# Patient Record
Sex: MALE | Race: WHITE | NOT HISPANIC OR LATINO | Employment: OTHER | URBAN - METROPOLITAN AREA
[De-identification: names, ages, dates, MRNs, and addresses within clinical notes are randomized per-mention and may not be internally consistent; named-entity substitution may affect disease eponyms.]

---

## 2017-02-28 PROBLEM — E78.5 DYSLIPIDEMIA: Status: ACTIVE | Noted: 2017-02-28

## 2017-02-28 PROBLEM — I25.9 IHD (ISCHEMIC HEART DISEASE): Status: ACTIVE | Noted: 2017-02-28

## 2017-07-18 ENCOUNTER — OFFICE VISIT (OUTPATIENT)
Dept: CARDIOLOGY | Facility: CLINIC | Age: 77
End: 2017-07-18

## 2017-07-18 VITALS
BODY MASS INDEX: 28.71 KG/M2 | SYSTOLIC BLOOD PRESSURE: 124 MMHG | WEIGHT: 216.6 LBS | HEART RATE: 72 BPM | DIASTOLIC BLOOD PRESSURE: 72 MMHG | HEIGHT: 73 IN

## 2017-07-18 DIAGNOSIS — E78.5 HYPERLIPIDEMIA LDL GOAL <70: ICD-10-CM

## 2017-07-18 DIAGNOSIS — I25.10 CORONARY ARTERY DISEASE INVOLVING NATIVE CORONARY ARTERY OF NATIVE HEART WITHOUT ANGINA PECTORIS: Primary | ICD-10-CM

## 2017-07-18 PROCEDURE — 99213 OFFICE O/P EST LOW 20 MIN: CPT | Performed by: NURSE PRACTITIONER

## 2017-07-18 RX ORDER — FINASTERIDE 5 MG/1
5 TABLET, FILM COATED ORAL DAILY
COMMUNITY

## 2017-07-18 RX ORDER — DIMENHYDRINATE 50 MG
TABLET ORAL
COMMUNITY

## 2017-07-18 RX ORDER — SIMVASTATIN 10 MG
10 TABLET ORAL NIGHTLY
COMMUNITY

## 2017-07-18 RX ORDER — ASPIRIN 81 MG/1
81 TABLET ORAL DAILY
Start: 2017-07-18

## 2017-07-18 RX ORDER — MELATONIN
1000 DAILY
COMMUNITY

## 2017-07-18 RX ORDER — UBIDECARENONE 100 MG
100 CAPSULE ORAL DAILY
COMMUNITY

## 2017-07-18 RX ORDER — SODIUM PHOSPHATE,MONO-DIBASIC 19G-7G/118
1 ENEMA (ML) RECTAL
COMMUNITY

## 2017-07-18 RX ORDER — DULOXETIN HYDROCHLORIDE 60 MG/1
60 CAPSULE, DELAYED RELEASE ORAL DAILY
COMMUNITY

## 2017-07-18 NOTE — PROGRESS NOTES
Encounter Date:07/18/2017    Patient ID: Regino Wang is a 77 y.o. 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    CC/Reason for visit:  Ischemic heart disease          Regino Wang returns today for 12 month follow-up for his coronary artery disease and cardiac risk factors.  He was formerly a patient of Dr. Uriah Klein.  Since his last visit he has been doing fairly well.  He has been trying to stay active and is currently doing water aerobics several times per week.  He he did have one episode of chest discomfort several months ago and he applied his electronic pathology her to the area and it resolved his symptoms.  He denies any undue dyspnea on exertion.  Overall he feels he is doing well.  He recently had blood work with his primary care provider and his LDL is at goal with a result of 64.  His triglycerides were slightly elevated at 161.    Review of Systems   Constitution: Positive for weakness.   Musculoskeletal: Positive for arthritis and muscle weakness.   Genitourinary: Positive for frequency.   Psychiatric/Behavioral: The patient is nervous/anxious.        The patient's past medical, social, and family history reviewed in the patient's electronic medical record.    Allergies  Lipitor [atorvastatin] and Pravachol [pravastatin sodium]    Outpatient Prescriptions Marked as Taking for the 7/18/17 encounter (Office Visit) with MARY Berger   Medication Sig Dispense Refill   • cholecalciferol (VITAMIN D3) 1000 UNITS tablet Take 1,000 Units by mouth Daily.     • coenzyme Q10 100 MG capsule Take 100 mg by mouth Daily.     • DULoxetine (CYMBALTA) 60 MG capsule Take 60 mg by mouth Daily.     • finasteride (PROSCAR) 5 MG tablet Take 5 mg by mouth Daily.     • Flaxseed, Linseed, (FLAX SEED OIL) 1000 MG capsule Take  by mouth.     • Garlic 100 MG tablet Take  by mouth.     •  "glucosamine-chondroitin 500-400 MG capsule capsule Take 1 capsule by mouth 3 (Three) Times a Day With Meals.     • saw palmetto 160 MG capsule Take 160 mg by mouth Daily.     • simvastatin (ZOCOR) 10 MG tablet Take 10 mg by mouth Every Night.     • TURMERIC PO Take  by mouth.           Blood pressure 124/72, pulse 72, height 73\" (185.4 cm), weight 216 lb 9.6 oz (98.2 kg).  Body mass index is 28.58 kg/(m^2).    Physical Exam   Constitutional: He is oriented to person, place, and time. He appears well-developed and well-nourished.   HENT:   Head: Normocephalic and atraumatic.   Eyes: Pupils are equal, round, and reactive to light. No scleral icterus.   Neck: No JVD present. Carotid bruit is not present. No thyromegaly present.   Cardiovascular: Normal rate, regular rhythm, S1 normal and S2 normal.  Exam reveals no gallop.    No murmur heard.  Pulmonary/Chest: Effort normal and breath sounds normal.   Abdominal: Soft. There is no tenderness.   Neurological: He is alert and oriented to person, place, and time.   Skin: Skin is warm and dry. No cyanosis. Nails show no clubbing.   Psychiatric: He has a normal mood and affect. His behavior is normal.       Data Review:   Procedures  Laboratory data obtained 03/31/2017:  Total cholesterol 129, HDL 33, triglycerides 161, LDL 64, BUNs 22, creatinine 0.94, sodium 141, potassium 3.9, AST 23, a LT 20, WBC 5.0, hemoglobin 16.2, hematocrit 49.7, platelets 131       Problem List Items Addressed This Visit        Cardiovascular and Mediastinum    Coronary artery disease involving native coronary artery of native heart without angina pectoris - Primary    Overview     · Cardiac catheterization (08/26/1998): 70% mid-LAD stenosis reduced to 0% following deployment of two 3.5 mm. DENA stents. 60% tubular stenosis of mid-dominant RCA. LVEF 50%; no MR or MVP; severe hypokinesis of the mid-anterior wall.  · Adenosine Cardiolite stress test (08/23/2006): LVEF 38% without wall motion " abnormality. Reversible photopenia involving the base of the lateral aspect of the anterior wall, possibly artifactual.  · Maximal Cardiolite stress test (01/22/2009):  negative for ischemia and scar.  LVEF 53% without segmental wall motion abnormality.  · Nuclear stress test (04/09/2014):  Possible mild inferior wall ischemia by visual assessment; no defect detected by computer analysis; LVEF 48%.         Current Assessment & Plan     · Continue aspirin 81 mg daily         Relevant Medications    aspirin 81 MG EC tablet    Hyperlipidemia LDL goal <70    Overview     · High-intensity statin therapy indicated given presence coronary artery disease         Current Assessment & Plan     · Continue Zocor 10 mg daily  · Follow-up PCP for routine lipid monitoring         Relevant Medications    simvastatin (ZOCOR) 10 MG tablet        Mr. Wang is doing well from a cardiovascular standpoint.  His one episode of chest pain sounds to be more musculoskeletal in nature and not cardiac related.  We will continue his current medications and schedule follow-up in 12 months or sooner if needed.       · Continue current medications next follow-up department sooner if needed  · Will consider repeat stress test next visit      Muna HERNANDEZ evaluated treated patient    MARY Nelson  7/18/2017

## 2017-10-31 ENCOUNTER — OFFICE VISIT (OUTPATIENT)
Dept: CARDIOLOGY | Facility: CLINIC | Age: 77
End: 2017-10-31

## 2017-10-31 VITALS
BODY MASS INDEX: 28.94 KG/M2 | HEIGHT: 73 IN | SYSTOLIC BLOOD PRESSURE: 134 MMHG | DIASTOLIC BLOOD PRESSURE: 90 MMHG | WEIGHT: 218.4 LBS | HEART RATE: 89 BPM

## 2017-10-31 DIAGNOSIS — I25.10 CORONARY ARTERY DISEASE INVOLVING NATIVE CORONARY ARTERY OF NATIVE HEART WITHOUT ANGINA PECTORIS: Primary | ICD-10-CM

## 2017-10-31 DIAGNOSIS — I25.119 CORONARY ARTERY DISEASE INVOLVING NATIVE CORONARY ARTERY OF NATIVE HEART WITH ANGINA PECTORIS (HCC): ICD-10-CM

## 2017-10-31 DIAGNOSIS — E78.5 HYPERLIPIDEMIA LDL GOAL <70: ICD-10-CM

## 2017-10-31 DIAGNOSIS — R06.02 SHORTNESS OF BREATH: ICD-10-CM

## 2017-10-31 DIAGNOSIS — I10 ESSENTIAL HYPERTENSION: ICD-10-CM

## 2017-10-31 PROBLEM — R06.09 DYSPNEA ON EXERTION: Status: ACTIVE | Noted: 2017-10-31

## 2017-10-31 PROBLEM — G47.33 OBSTRUCTIVE SLEEP APNEA SYNDROME: Status: ACTIVE | Noted: 2017-10-31

## 2017-10-31 PROCEDURE — 99214 OFFICE O/P EST MOD 30 MIN: CPT | Performed by: NURSE PRACTITIONER

## 2017-10-31 RX ORDER — BUPROPION HYDROCHLORIDE 100 MG/1
100 TABLET, EXTENDED RELEASE ORAL DAILY
COMMUNITY

## 2017-10-31 NOTE — PROGRESS NOTES
"Encounter Date:10/31/2017    Patient ID: Regino Wang is a 77 y.o. 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  He currently resides in [place], [place].    CC/Reason for visit:  Fatigue (Follow up per Dr. Zavaleta); Coronary Artery Disease; and Hypertension            Regino Wang returns today sooner than expected for assessment of his increased fatigue and shortness of breath.  Since his last visit he was evaluated by his primary care provider for complaints of increased fatigue and dyspnea that occurs at with exertion and at rest.  He is also very concerned about an increase in weakness and loss of balance for which he does have a referral to neurology later this month..  According to the patient's wife the he is unable to be active and often has to lie down after eating meals because he becomes so fatigued.  The patient is a volunteer  at [facility] and used to be very active walking out in the \"yard\" but is no longer able to do that due to increased shortness of breath and fatigue.  He also used to attend water aerobics regularly but has not been able to do that activity either.  The patient has underwent bilateral hip and left knee replacements in the past and anticipates undergoing a right knee replacement with Dr. Dallin Velazquez in the near future.  He is very concerned about his symptoms and would like to be cleared from a cardiovascular perspective.    Review of Systems   Constitution: Positive for weakness and malaise/fatigue.   HENT: Positive for hearing loss.    Eyes: Positive for vision loss in left eye and vision loss in right eye.   Respiratory: Positive for sleep disturbances due to breathing.    Endocrine: Positive for polyuria.   Musculoskeletal: Positive for arthritis, joint pain, joint swelling and muscle weakness.   Genitourinary: Positive for bladder incontinence and " "frequency.   Neurological: Positive for excessive daytime sleepiness and loss of balance.       The patient's past medical, social, family history and ROS reviewed in the patient's electronic medical record.    Allergies  Lipitor [atorvastatin]; Lisinopril; and Pravachol [pravastatin sodium]    Outpatient Prescriptions Marked as Taking for the 10/31/17 encounter (Office Visit) with MARY Berger   Medication Sig Dispense Refill   • aspirin 81 MG EC tablet Take 1 tablet by mouth Daily.     • buPROPion SR (WELLBUTRIN SR) 100 MG 12 hr tablet Take 100 mg by mouth Daily.     • cholecalciferol (VITAMIN D3) 1000 UNITS tablet Take 1,000 Units by mouth Daily.     • coenzyme Q10 100 MG capsule Take 100 mg by mouth Daily.     • DULoxetine (CYMBALTA) 60 MG capsule Take 60 mg by mouth Daily.     • finasteride (PROSCAR) 5 MG tablet Take 5 mg by mouth Daily.     • Flaxseed, Linseed, (FLAX SEED OIL) 1000 MG capsule Take  by mouth.     • Garlic 100 MG tablet Take  by mouth.     • glucosamine-chondroitin 500-400 MG capsule capsule Take 1 capsule by mouth 3 (Three) Times a Day With Meals.     • saw palmetto 160 MG capsule Take 160 mg by mouth Daily.     • simvastatin (ZOCOR) 10 MG tablet Take 10 mg by mouth Every Night.     • TURMERIC PO Take  by mouth.           Blood pressure 134/90, pulse 89, height 73\" (185.4 cm), weight 218 lb 6.4 oz (99.1 kg).  Body mass index is 28.81 kg/(m^2).    Physical Exam   Constitutional: He is oriented to person, place, and time. He appears well-developed and well-nourished.   HENT:   Head: Normocephalic and atraumatic.   Eyes: Pupils are equal, round, and reactive to light. No scleral icterus.   Neck: No JVD present. Carotid bruit is not present. No thyromegaly present.   Cardiovascular: Normal rate, regular rhythm, S1 normal and S2 normal.  Exam reveals no gallop.    No murmur heard.  Pulmonary/Chest: Effort normal and breath sounds normal.   Abdominal: Soft. There is no hepatosplenomegaly. " There is no tenderness.   Neurological: He is alert and oriented to person, place, and time.   Skin: Skin is warm and dry. No cyanosis. Nails show no clubbing.   Psychiatric: He has a normal mood and affect. His behavior is normal.       Data Review:   Procedures       Problem List Items Addressed This Visit        Cardiovascular and Mediastinum    Coronary artery disease involving native coronary artery of native heart with angina pectoris - Primary    Overview     · Cardiac catheterization (08/26/1998): 70% mid-LAD stenosis reduced to 0% following deployment of two 3.5 mm. DENA stents. 60% tubular stenosis of mid-dominant RCA. LVEF 50%; no MR or MVP; severe hypokinesis of the mid-anterior wall.  · Adenosine Cardiolite stress test (08/23/2006): LVEF 38% without wall motion abnormality. Reversible photopenia involving the base of the lateral aspect of the anterior wall, possibly artifactual.  · Maximal Cardiolite stress test (01/22/2009):  negative for ischemia and scar.  LVEF 53% without segmental wall motion abnormality.  · Nuclear stress test (04/09/2014):  Possible mild inferior wall ischemia by visual assessment; no defect detected by computer analysis; LVEF 48%.         Current Assessment & Plan     · Possible anginal equivalent with increased shortness of breath and fatigue  · Start metoprolol tartrate 12.5 mg twice a day  · Schedule echocardiogram and Lexiscan          Relevant Medications    metoprolol tartrate (LOPRESSOR) 25 MG tablet    Hyperlipidemia LDL goal <70    Overview     · High-intensity statin therapy indicated given presence coronary artery disease  · Intolerant to pravastatin and atorvastatin         Current Assessment & Plan     · Continues Zocor 10 mg daily         Essential hypertension    Current Assessment & Plan     · Start metoprolol 12.5 mg twice a day         Relevant Medications    metoprolol tartrate (LOPRESSOR) 25 MG tablet       Respiratory    Shortness of breath    Overview      · NYHA class 3-4 symptoms reported on 10/31/2017         Current Assessment & Plan     · Schedule echocardiogram and Lexiscan         Relevant Orders    Adult Transthoracic Echo Complete W/ Cont if Necessary Per Protocol    Stress Test With Myocardial Perfusion (1 Day)        Mr. mares is experiencing signs or symptoms that could be an anginal equivalent therefore we will order an echocardiogram and myocardial perfusion study to provide cardiac clearance to undergo knee replacement.  He is currently not on any antianginals so we will start him on metoprolol 12.5 mg twice a day.  We will go ahead and schedule follow-up for 3 months but will contact him for further recommendations following his test results.       · Start metoprolol 12.5 mg twice a day  · Schedule echocardiogram and Lexiscan  · Follow-up 3 months.  Maybe sooner pending results of tests.  · If echo and stress test are normal will provide cardiac clearance to Dr. Harika Mcneal to undergo right knee replacement in the near future    MARY Nelson  10/31/2017

## 2017-10-31 NOTE — ASSESSMENT & PLAN NOTE
· Possible anginal equivalent with increased shortness of breath and fatigue  · Start metoprolol tartrate 12.5 mg twice a day  · Schedule echocardiogram and Lexiscan

## 2018-01-10 ENCOUNTER — TELEPHONE (OUTPATIENT)
Dept: CARDIOLOGY | Facility: CLINIC | Age: 78
End: 2018-01-10

## 2018-01-10 NOTE — TELEPHONE ENCOUNTER
The wife would like to proceed with the previously scheduled testing. Scheduling notified to call for an appt.

## 2018-01-10 NOTE — TELEPHONE ENCOUNTER
Dr. Zavaleta's office called earlier concerned about the patients shortness of breath and fatigue. He was ordered to have a stress test and echo at his last visit and it has not been preformed yet (scheduling notified). Per HTR, patient can proceed with the ordered testing or a heart cath. It is up to the patient and his wife. I called the patient and his wife and had to leave a message. Will await return call.

## 2018-01-24 ENCOUNTER — HOSPITAL ENCOUNTER (OUTPATIENT)
Dept: CARDIOLOGY | Facility: HOSPITAL | Age: 78
Discharge: HOME | End: 2018-01-24

## 2018-01-24 ENCOUNTER — HOSPITAL ENCOUNTER (OUTPATIENT)
Dept: CARDIOLOGY | Facility: HOSPITAL | Age: 78
Discharge: HOME | End: 2018-01-24
Admitting: NURSE PRACTITIONER

## 2018-01-24 VITALS
SYSTOLIC BLOOD PRESSURE: 150 MMHG | HEIGHT: 73 IN | DIASTOLIC BLOOD PRESSURE: 90 MMHG | BODY MASS INDEX: 28.63 KG/M2 | HEART RATE: 58 BPM | WEIGHT: 216 LBS

## 2018-01-24 DIAGNOSIS — R06.02 SHORTNESS OF BREATH: ICD-10-CM

## 2018-01-24 LAB
BH CV ECHO MEAS - AO ROOT AREA (BSA CORRECTED): 1.4
BH CV ECHO MEAS - AO ROOT AREA: 8.2 CM^2
BH CV ECHO MEAS - AO ROOT DIAM: 3.2 CM
BH CV ECHO MEAS - BSA(HAYCOCK): 2.3 M^2
BH CV ECHO MEAS - BSA: 2.2 M^2
BH CV ECHO MEAS - BZI_BMI: 28.8 KILOGRAMS/M^2
BH CV ECHO MEAS - BZI_METRIC_HEIGHT: 185.4 CM
BH CV ECHO MEAS - BZI_METRIC_WEIGHT: 98.9 KG
BH CV ECHO MEAS - EDV(CUBED): 125.4 ML
BH CV ECHO MEAS - EDV(TEICH): 118.5 ML
BH CV ECHO MEAS - EF(CUBED): 63.1 %
BH CV ECHO MEAS - EF(TEICH): 54.4 %
BH CV ECHO MEAS - ESV(CUBED): 46.3 ML
BH CV ECHO MEAS - ESV(TEICH): 54.1 ML
BH CV ECHO MEAS - FS: 28.3 %
BH CV ECHO MEAS - IVS/LVPW: 1
BH CV ECHO MEAS - IVSD: 1.2 CM
BH CV ECHO MEAS - LA DIMENSION: 3.2 CM
BH CV ECHO MEAS - LA/AO: 0.98
BH CV ECHO MEAS - LV MASS(C)D: 242 GRAMS
BH CV ECHO MEAS - LV MASS(C)DI: 108.4 GRAMS/M^2
BH CV ECHO MEAS - LVIDD: 5 CM
BH CV ECHO MEAS - LVIDS: 3.6 CM
BH CV ECHO MEAS - LVPWD: 1.2 CM
BH CV ECHO MEAS - MV A MAX VEL: 72.6 CM/SEC
BH CV ECHO MEAS - MV DEC TIME: 0.25 SEC
BH CV ECHO MEAS - MV E MAX VEL: 59.7 CM/SEC
BH CV ECHO MEAS - MV E/A: 0.82
BH CV ECHO MEAS - PA ACC SLOPE: 410.8 CM/SEC^2
BH CV ECHO MEAS - PA ACC TIME: 0.15 SEC
BH CV ECHO MEAS - PA PR(ACCEL): 10.9 MMHG
BH CV ECHO MEAS - PI END-D VEL: 112.7 CM/SEC
BH CV ECHO MEAS - RV MAX PG: 0.96 MMHG
BH CV ECHO MEAS - RV V1 MAX: 48.9 CM/SEC
BH CV ECHO MEAS - SI(CUBED): 35.5 ML/M^2
BH CV ECHO MEAS - SI(TEICH): 28.9 ML/M^2
BH CV ECHO MEAS - SV(CUBED): 79.1 ML
BH CV ECHO MEAS - SV(TEICH): 64.5 ML
BH CV ECHO MEAS - TAPSE (>1.6): 1.8 CM2
BH CV XLRA - RV BASE: 3.7 CM
BH CV XLRA - RV LENGTH: 8.1 CM
BH CV XLRA - RV MID: 3.2 CM
BH CV XLRA - TDI S': 10.4 CM/SEC
LEFT ATRIUM VOLUME INDEX: 21.1 ML/M2
LEFT ATRIUM VOLUME: 47 CM3
LV EF 2D ECHO EST: 65 %
MAXIMAL PREDICTED HEART RATE: 144 BPM
STRESS TARGET HR: 122 BPM

## 2018-01-24 PROCEDURE — 93017 CV STRESS TEST TRACING ONLY: CPT

## 2018-01-24 PROCEDURE — A9500 TC99M SESTAMIBI: HCPCS | Performed by: NURSE PRACTITIONER

## 2018-01-24 PROCEDURE — 93306 TTE W/DOPPLER COMPLETE: CPT

## 2018-01-24 PROCEDURE — 25010000002 REGADENOSON 0.4 MG/5ML SOLUTION: Performed by: INTERNAL MEDICINE

## 2018-01-24 PROCEDURE — 93018 CV STRESS TEST I&R ONLY: CPT | Performed by: INTERNAL MEDICINE

## 2018-01-24 PROCEDURE — 78452 HT MUSCLE IMAGE SPECT MULT: CPT

## 2018-01-24 PROCEDURE — 93306 TTE W/DOPPLER COMPLETE: CPT | Performed by: INTERNAL MEDICINE

## 2018-01-24 PROCEDURE — 0 TECHNETIUM SESTAMIBI: Performed by: NURSE PRACTITIONER

## 2018-01-24 PROCEDURE — 78452 HT MUSCLE IMAGE SPECT MULT: CPT | Performed by: INTERNAL MEDICINE

## 2018-01-24 RX ADMIN — TECHNETIUM TC 99M SESTAMIBI 1 DOSE: 1 INJECTION INTRAVENOUS at 11:00

## 2018-01-24 RX ADMIN — TECHNETIUM TC 99M SESTAMIBI 1 DOSE: 1 INJECTION INTRAVENOUS at 12:35

## 2018-01-24 RX ADMIN — REGADENOSON 0.4 MG: 0.08 INJECTION, SOLUTION INTRAVENOUS at 12:34

## 2018-01-25 LAB
BH CV STRESS BP STAGE 2: NORMAL
BH CV STRESS BP STAGE 4: NORMAL
BH CV STRESS COMMENTS STAGE 1: NORMAL
BH CV STRESS DOSE REGADENOSON STAGE 1: 0.4
BH CV STRESS DURATION MIN STAGE 1: 1
BH CV STRESS DURATION MIN STAGE 2: 1
BH CV STRESS DURATION MIN STAGE 3: 1
BH CV STRESS DURATION MIN STAGE 4: 1
BH CV STRESS DURATION SEC STAGE 2: 0
BH CV STRESS HR STAGE 1: 60
BH CV STRESS HR STAGE 2: 78
BH CV STRESS HR STAGE 3: 71
BH CV STRESS HR STAGE 4: 71
BH CV STRESS PROTOCOL 1: NORMAL
BH CV STRESS RECOVERY BP: NORMAL MMHG
BH CV STRESS RECOVERY HR: 69 BPM
BH CV STRESS STAGE 1: 1
BH CV STRESS STAGE 2: 2
BH CV STRESS STAGE 3: 3
BH CV STRESS STAGE 4: 4
LV EF NUC BP: 45 %
MAXIMAL PREDICTED HEART RATE: 144 BPM
PERCENT MAX PREDICTED HR: 54.17 %
STRESS BASELINE BP: NORMAL MMHG
STRESS BASELINE HR: 59 BPM
STRESS PERCENT HR: 64 %
STRESS POST PEAK BP: NORMAL MMHG
STRESS POST PEAK HR: 78 BPM
STRESS TARGET HR: 122 BPM

## 2018-01-26 ENCOUNTER — TELEPHONE (OUTPATIENT)
Dept: CARDIOLOGY | Facility: CLINIC | Age: 78
End: 2018-01-26

## 2018-01-26 NOTE — TELEPHONE ENCOUNTER
Discussed his stress and echo results with his wife. Also verified he was on metoprolol 12.5 mg BID.